# Patient Record
Sex: FEMALE
[De-identification: names, ages, dates, MRNs, and addresses within clinical notes are randomized per-mention and may not be internally consistent; named-entity substitution may affect disease eponyms.]

---

## 2018-04-16 ENCOUNTER — HOSPITAL ENCOUNTER (EMERGENCY)
Dept: HOSPITAL 14 - H.ER | Age: 43
Discharge: HOME | End: 2018-04-16
Payer: MEDICAID

## 2018-04-16 VITALS
HEART RATE: 78 BPM | TEMPERATURE: 97 F | DIASTOLIC BLOOD PRESSURE: 78 MMHG | RESPIRATION RATE: 19 BRPM | OXYGEN SATURATION: 98 % | SYSTOLIC BLOOD PRESSURE: 120 MMHG

## 2018-04-16 DIAGNOSIS — R07.9: Primary | ICD-10-CM

## 2018-04-16 LAB
ALBUMIN SERPL-MCNC: 4.1 G/DL (ref 3.5–5)
ALBUMIN/GLOB SERPL: 1 {RATIO} (ref 1–2.1)
ALT SERPL-CCNC: 54 U/L (ref 9–52)
APTT BLD: 29.1 SECONDS (ref 25.6–37.1)
AST SERPL-CCNC: 35 U/L (ref 14–36)
BASOPHILS # BLD AUTO: 0 K/UL (ref 0–0.2)
BASOPHILS NFR BLD: 0.5 % (ref 0–2)
BUN SERPL-MCNC: 13 MG/DL (ref 7–17)
CALCIUM SERPL-MCNC: 9.2 MG/DL (ref 8.4–10.2)
D DIMER: 156 NG/MLDDU (ref 0–230)
EOSINOPHIL # BLD AUTO: 0 K/UL (ref 0–0.7)
EOSINOPHIL NFR BLD: 0.7 % (ref 0–4)
ERYTHROCYTE [DISTWIDTH] IN BLOOD BY AUTOMATED COUNT: 13.1 % (ref 11.5–14.5)
GFR NON-AFRICAN AMERICAN: > 60
HGB BLD-MCNC: 13.5 G/DL (ref 12–16)
INR PPP: 1 (ref 0.9–1.2)
LYMPHOCYTES # BLD AUTO: 1.6 K/UL (ref 1–4.3)
LYMPHOCYTES NFR BLD AUTO: 23.5 % (ref 20–40)
MCH RBC QN AUTO: 30.4 PG (ref 27–31)
MCHC RBC AUTO-ENTMCNC: 34.2 G/DL (ref 33–37)
MCV RBC AUTO: 88.9 FL (ref 81–99)
MONOCYTES # BLD: 0.5 K/UL (ref 0–0.8)
MONOCYTES NFR BLD: 6.7 % (ref 0–10)
NEUTROPHILS # BLD: 4.7 K/UL (ref 1.8–7)
NEUTROPHILS NFR BLD AUTO: 68.6 % (ref 50–75)
NRBC BLD AUTO-RTO: 0.1 % (ref 0–0)
PLATELET # BLD: 239 K/UL (ref 130–400)
PMV BLD AUTO: 8.5 FL (ref 7.2–11.7)
PROTHROMBIN TIME: 11.1 SECONDS (ref 9.8–13.1)
RBC # BLD AUTO: 4.44 MIL/UL (ref 3.8–5.2)
WBC # BLD AUTO: 6.8 K/UL (ref 4.8–10.8)

## 2018-04-16 PROCEDURE — 80053 COMPREHEN METABOLIC PANEL: CPT

## 2018-04-16 PROCEDURE — 81025 URINE PREGNANCY TEST: CPT

## 2018-04-16 PROCEDURE — 85730 THROMBOPLASTIN TIME PARTIAL: CPT

## 2018-04-16 PROCEDURE — 99284 EMERGENCY DEPT VISIT MOD MDM: CPT

## 2018-04-16 PROCEDURE — 96374 THER/PROPH/DIAG INJ IV PUSH: CPT

## 2018-04-16 PROCEDURE — 85610 PROTHROMBIN TIME: CPT

## 2018-04-16 PROCEDURE — 71046 X-RAY EXAM CHEST 2 VIEWS: CPT

## 2018-04-16 PROCEDURE — 85378 FIBRIN DEGRADE SEMIQUANT: CPT

## 2018-04-16 PROCEDURE — 85025 COMPLETE CBC W/AUTO DIFF WBC: CPT

## 2018-04-16 PROCEDURE — 93005 ELECTROCARDIOGRAM TRACING: CPT

## 2018-04-16 PROCEDURE — 84484 ASSAY OF TROPONIN QUANT: CPT

## 2018-04-16 NOTE — ED PDOC
HPI: Chest Pain


Time Seen by Provider: 04/16/18 09:24


Chief Complaint (Nursing): Chest Pain


Chief Complaint (Provider): Chest Pain


History Per: Patient


History/Exam Limitations: no limitations


Onset/Duration Of Symptoms: Hrs (x 4)


Current Symptoms Are (Timing): Still Present


Additional Complaint(s): 





42-year-old female who presents to ED complaining of right-sided chest pain, 

onset 05:00 this morning. Patient states chest pain woke her up. Describes pain 

as pressure pain. Pain worsens with movement. 














PMD: Victor Manuel Williamson








Past Medical History


Reviewed: Historical Data, Nursing Documentation, Vital Signs


Vital Signs: 


 Last Vital Signs











Temp  98.0 F   04/16/18 08:40


 


Pulse  81   04/16/18 08:40


 


Resp  21   04/16/18 08:40


 


BP  135/86   04/16/18 08:40


 


Pulse Ox  99   04/16/18 11:59














- Medical History


PMH: Gastritis


   Denies: Chronic Kidney Disease





- Surgical History


Other surgeries: Liposunction





- Family History


Family History: States: Unknown Family Hx





- Social History


Current smoker - smoking cessation education provided: No


Alcohol: Social


Drugs: Denies





- Immunization History


Hx Influenza Vaccination: No


Hx Pneumococcal Vaccination: No





- Home Medications


Home Medications: 


 Ambulatory Orders











 Medication  Instructions  Recorded


 


Acetaminophen with Codeine 1 tab PO Q6H PRN #10 tab 09/26/16





[Tylenol with Codeine No. 3 300  





mg-30 mg]  


 


Cyclobenzaprine [Cyclobenzaprine 10 mg PO TID PRN #15 tab 04/16/18





HCl]  














- Allergies


Allergies/Adverse Reactions: 


 Allergies











Allergy/AdvReac Type Severity Reaction Status Date / Time


 


aspirin Allergy   Verified 05/24/16 15:45














Review of Systems


ROS Statement: Except As Marked, All Systems Reviewed And Found Negative


Cardiovascular: Positive for: Chest Pain (right-sided)





Physical Exam





- Reviewed


Nursing Documentation Reviewed: Yes


Vital Signs Reviewed: Yes





- Physical Exam


Appears: Positive for: Non-toxic


Head Exam: Positive for: ATRAUMATIC, NORMAL INSPECTION, NORMOCEPHALIC


Skin: Positive for: Normal Color, Warm, Dry


Eye Exam: Positive for: Normal appearance


ENT: Positive for: Normal ENT Inspection


Neck: Positive for: Normal


Cardiovascular/Chest: Positive for: Regular Rate, Rhythm, Other (Tenderness to 

palpations)


Respiratory: Positive for: Normal Breath Sounds.  Negative for: Respiratory 

Distress


Back: Positive for: Normal Inspection


Extremity: Positive for: Normal ROM.  Negative for: Deformity


Neurologic/Psych: Positive for: Alert, Oriented (x 3).  Negative for: Facial 

Droop





- Laboratory Results


Result Diagrams: 


 04/16/18 09:53





 04/16/18 09:53


Urine Pregnancy POC: Negative





- ECG


O2 Sat by Pulse Oximetry: 99 (RA)


Pulse Ox Interpretation: Normal





Medical Decision Making


Medical Decision Making: 





Time: 09:44


Plan:


- EKG


- CMP


- Troponin I


- ED Urine Prengnacy


- ED Urine Dipstick


- CBC


- D-Dimer


- Partial Thromboplastin Time


- Prothrombin Time


- Chest X-Ray











(-) ED Urine Pregnancy











D-Dimer, Quantitative


Reveals 156 ng/mlDDU [within range]











Troponin I


Reveals < 0.0120 [within range]











Time: 10:25


Chest X-Ray


FINDINGS:





LUNGS:


No active pulmonary disease.





PLEURA:


No significant pleural effusion identified. No pneumothorax apparent.





CARDIOVASCULAR:


Normal.





OSSEOUS STRUCTURES:


No significant abnormalities.





VISUALIZED UPPER ABDOMEN:


Normal.





OTHER FINDINGS:


None.





IMPRESSION:


No active disease. No significant interval change compared to the prior 

examination(s).




















Upon provider evaluation patient is medically stable, and requires no further 

treatment in the ED at this time. Patient will be discharged with Rx for 

Cyclobenzaprine HCl. Counseling was provided and all questions were answered 

regarding diagnosis and need for follow up with PMD. There is agreement to 

discharge plan. Return if symptoms persist or worsen. 











--------------------------------------------------------------------------------

-----------------


Scribe Attestation:


Documented by Tamir Yang, acting as a scribe for Maritza Martines MD





Provider Scribe Attestation:


All medical record entries made by the Scribe were at my direction and 

personally dictated by me. I have reviewed the chart and agree that the record 

accurately reflects my personal performance of the history, physical exam, 

medical decision making, and the department course for this patient. I have 

also personally directed, reviewed, and agree with the discharge instructions 

and disposition.





Disposition





- Clinical Impression


Clinical Impression: 


 Chest wall pain








- Patient ED Disposition


Is Patient to be Admitted: No





- Disposition


Referrals: 


Victor Manuel Williamson MD [Family Provider] - 


Disposition: Routine/Home


Disposition Time: 11:49


Condition: IMPROVED


Prescriptions: 


Cyclobenzaprine [Cyclobenzaprine HCl] 10 mg PO TID PRN #15 tab


 PRN Reason: Pain


Instructions:  Chest Pain


Forms:  [facility] (Maori)


Print Language: Cameroonian

## 2018-04-16 NOTE — CARD
--------------- APPROVED REPORT --------------





EKG Measurement

Heart [id number]

MS [id number]

[id number]

[id number]

[id number]



<Conclusion>

Normal sinus rhythm

Normal ECG

## 2018-04-16 NOTE — RAD
HISTORY:

R chest wall pain  



COMPARISON:

09/26/2016



TECHNIQUE:

Chest PA and lateral



FINDINGS:



LUNGS:

No active pulmonary disease.



PLEURA:

No significant pleural effusion identified. No pneumothorax apparent.



CARDIOVASCULAR:

Normal.



OSSEOUS STRUCTURES:

No significant abnormalities.



VISUALIZED UPPER ABDOMEN:

Normal.



OTHER FINDINGS:

None.



IMPRESSION:

No active disease. No significant interval change compared to the 

prior examination(s).

## 2018-10-06 ENCOUNTER — HOSPITAL ENCOUNTER (EMERGENCY)
Dept: HOSPITAL 14 - H.ER | Age: 43
Discharge: HOME | End: 2018-10-06
Payer: COMMERCIAL

## 2018-10-06 VITALS
HEART RATE: 80 BPM | OXYGEN SATURATION: 99 % | TEMPERATURE: 98 F | RESPIRATION RATE: 16 BRPM | SYSTOLIC BLOOD PRESSURE: 121 MMHG | DIASTOLIC BLOOD PRESSURE: 81 MMHG

## 2018-10-06 DIAGNOSIS — K64.5: Primary | ICD-10-CM

## 2018-10-06 NOTE — ED PDOC
HPI: General Adult


Time Seen by Provider: 10/06/18 17:07


Chief Complaint (Nursing): GI Problem


Chief Complaint (Provider): GI Problem


History Per: Patient


History/Exam Limitations: no limitations


Onset/Duration Of Symptoms: Days (x4)


Current Symptoms Are (Timing): Still Present


Additional Complaint(s): 





43 year old female presents to the ED complaining of a hemorrhoid.  Patient 

reports for the last 4 days, she has been feeling pain with bowel movement and 

sensation of a mass at her rectum.  Pain has increased for the last 4 days and 

today associated with blood after a bowel movement.  She has been applying 

preparation H with no relief.  Patient denies history of hemorrhoids but does 

have a history of intermittent constipation which she has not taken medications 

for.  Patient has not been able to see her PMD due to insurance issues.  





PMD: Dr. Williamson





Past Medical History


Reviewed: Historical Data, Nursing Documentation, Vital Signs


Vital Signs: 





                                Last Vital Signs











Temp  98.0 F   10/06/18 16:56


 


Pulse  80   10/06/18 16:56


 


Resp  16   10/06/18 16:56


 


BP  121/81   10/06/18 16:56


 


Pulse Ox  99   10/06/18 16:56














- Medical History


PMH: Gastritis


   Denies: Chronic Kidney Disease





- Surgical History


Surgical History: No Surg Hx





- Family History


Family History: States: No Known Family Hx





- Social History


Current smoker - smoking cessation education provided: No


Alcohol: Social


Drugs: Denies





- Immunization History


Hx Influenza Vaccination: No


Hx Pneumococcal Vaccination: No





- Home Medications


Home Medications: 


                                Ambulatory Orders











 Medication  Instructions  Recorded


 


Acetaminophen with Codeine 1 tab PO Q6H PRN #10 tab 09/26/16





[Tylenol with Codeine No. 3 300  





mg-30 mg]  


 


Cyclobenzaprine [Cyclobenzaprine 10 mg PO TID PRN #15 tab 04/16/18





HCl]  


 


Docusate Sodium [Colace] 200 mg PO BID #30 capsule 10/06/18


 


Hydrocortisone/Pramoxine [Analpram 1 appl TP TID #60 ml 10/06/18





Hc 2.5%-1% Lotion]  


 


RX: traMADol [Ultram] 50 mg PO TID #15 tab 10/06/18














- Allergies


Allergies/Adverse Reactions: 


                                    Allergies











Allergy/AdvReac Type Severity Reaction Status Date / Time


 


aspirin Allergy   Verified 05/24/16 15:45














Review of Systems


ROS Statement: Except As Marked, All Systems Reviewed And Found Negative


Gastrointestinal: Positive for: Rectal Pain (Hemorrhoid), Other (Blood 

associated with bowel movement)





Physical Exam





- Reviewed


Nursing Documentation Reviewed: Yes


Vital Signs Reviewed: Yes





- Physical Exam


Appears: Positive for: In Acute Distress (Mild painful distress)


Gastrointestinal/Abdominal: Positive for: Normal Exam, Soft.  Negative for: 

Tenderness, Mass, Guarding, Rebound


Rectal: Positive for: Hemorrhoids (1 by 1.5cm thrombosed hemorrhoid on the left 

anal opening with no active bleeding.  Hemorrhoid is fleshy appearing with small

 areas of dark red eschar; tender with no surrounding erythema.  )


Comments: 





Rectal exam performed in the presence of RN Say





- ECG


O2 Sat by Pulse Oximetry: 99 (RA)


Pulse Ox Interpretation: Normal





Medical Decision Making


Medical Decision Making: 





Initial Impression: Thrombosed hemorrhoid





Initial Plan: 


Patient advised to use sitz bath and analgesics and to follow up with a surgeon.

  





 

--------------------------------------------------------------------------------


-----------------


Scribe Attestation:


Documented by Laron Ahuja acting as a scribe for Edna Olson MD.





Provider Scribe Attestation:


All medical record entries made by the Scribe were at my direction and 

personally dictated by me. I have reviewed the chart and agree that the record 

accurately reflects my personal performance of the history, physical exam, 

medical decision making, and the department course for this patient. I have also

 personally directed, reviewed, and agree with the discharge instructions and 

disposition.





Disposition





- Clinical Impression


Clinical Impression: 


 Hemorrhoid thrombosis








- Disposition


Referrals: 


[facility] [Outside] - 10/08/18 ([facility]AMA [facility] 

POR EILEEN HERIBERTO ESTA SEMANA. NECESITA VISITAR CLINICA POR UN REMISION A LA CLINICA 

QUIRURGICA)


Disposition: Routine/Home


Disposition Time: 17:30


Condition: STABLE


Prescriptions: 


Docusate Sodium [Colace] 200 mg PO BID #30 capsule


Hydrocortisone/Pramoxine [Analpram Hc 2.5%-1% Lotion] 1 appl TP TID #60 ml


RX: traMADol [Ultram] 50 mg PO TID #15 tab


Instructions:  Hemorrhoids (DC), How to Do a Sitz Bath


Forms:  [facility] ED School/Work Excuse


Print Language: Azeri